# Patient Record
Sex: FEMALE | Race: BLACK OR AFRICAN AMERICAN | NOT HISPANIC OR LATINO | URBAN - METROPOLITAN AREA
[De-identification: names, ages, dates, MRNs, and addresses within clinical notes are randomized per-mention and may not be internally consistent; named-entity substitution may affect disease eponyms.]

---

## 2023-08-07 ENCOUNTER — OUT OF OFFICE VISIT (OUTPATIENT)
Dept: URBAN - METROPOLITAN AREA MEDICAL CENTER 12 | Facility: MEDICAL CENTER | Age: 83
End: 2023-08-07
Payer: MEDICARE

## 2023-08-07 DIAGNOSIS — D53.8 OTHER SPECIFIED NUTRITIONAL ANEMIAS: ICD-10-CM

## 2023-08-07 DIAGNOSIS — K92.1 ACUTE MELENA: ICD-10-CM

## 2023-08-07 DIAGNOSIS — R63.4 ABNORMAL INTENTIONAL WEIGHT LOSS: ICD-10-CM

## 2023-08-07 DIAGNOSIS — K26.9 CHILDHOOD DUODENAL ULCER: ICD-10-CM

## 2023-08-07 DIAGNOSIS — R93.2 ABN FIND-BILIARY TRACT: ICD-10-CM

## 2023-08-07 DIAGNOSIS — K25.9 ANTRAL ULCER: ICD-10-CM

## 2023-08-07 PROCEDURE — 99222 1ST HOSP IP/OBS MODERATE 55: CPT | Performed by: STUDENT IN AN ORGANIZED HEALTH CARE EDUCATION/TRAINING PROGRAM

## 2023-08-07 PROCEDURE — 99232 SBSQ HOSP IP/OBS MODERATE 35: CPT | Performed by: STUDENT IN AN ORGANIZED HEALTH CARE EDUCATION/TRAINING PROGRAM

## 2023-08-07 PROCEDURE — G8427 DOCREV CUR MEDS BY ELIG CLIN: HCPCS | Performed by: STUDENT IN AN ORGANIZED HEALTH CARE EDUCATION/TRAINING PROGRAM

## 2023-08-09 ENCOUNTER — CLAIMS CREATED FROM THE CLAIM WINDOW (OUTPATIENT)
Dept: URBAN - METROPOLITAN AREA MEDICAL CENTER 12 | Facility: MEDICAL CENTER | Age: 83
End: 2023-08-09
Payer: MEDICARE

## 2023-08-09 ENCOUNTER — CLAIMS CREATED FROM THE CLAIM WINDOW (OUTPATIENT)
Dept: URBAN - METROPOLITAN AREA MEDICAL CENTER 12 | Facility: MEDICAL CENTER | Age: 83
End: 2023-08-09

## 2023-08-09 DIAGNOSIS — B96.81 BACTERIAL INFECTION DUE TO H. PYLORI: ICD-10-CM

## 2023-08-09 DIAGNOSIS — K26.9 CHILDHOOD DUODENAL ULCER: ICD-10-CM

## 2023-08-09 DIAGNOSIS — K29.60 ADENOPAPILLOMATOSIS GASTRICA: ICD-10-CM

## 2023-08-09 DIAGNOSIS — K29.80 ACUTE DUODENITIS: ICD-10-CM

## 2023-08-09 PROCEDURE — 43239 EGD BIOPSY SINGLE/MULTIPLE: CPT | Performed by: INTERNAL MEDICINE

## 2023-09-13 ENCOUNTER — DASHBOARD ENCOUNTERS (OUTPATIENT)
Age: 83
End: 2023-09-13

## 2023-09-13 ENCOUNTER — OFFICE VISIT (OUTPATIENT)
Dept: URBAN - METROPOLITAN AREA CLINIC 92 | Facility: CLINIC | Age: 83
End: 2023-09-13
Payer: MEDICARE

## 2023-09-13 ENCOUNTER — WEB ENCOUNTER (OUTPATIENT)
Dept: URBAN - METROPOLITAN AREA CLINIC 92 | Facility: CLINIC | Age: 83
End: 2023-09-13

## 2023-09-13 VITALS
HEART RATE: 84 BPM | WEIGHT: 112 LBS | SYSTOLIC BLOOD PRESSURE: 188 MMHG | HEIGHT: 65 IN | TEMPERATURE: 96.6 F | DIASTOLIC BLOOD PRESSURE: 83 MMHG | BODY MASS INDEX: 18.66 KG/M2

## 2023-09-13 DIAGNOSIS — A04.8 BACTERIAL INFECTION DUE TO H. PYLORI: ICD-10-CM

## 2023-09-13 DIAGNOSIS — K62.6 RECTAL ULCER: ICD-10-CM

## 2023-09-13 PROBLEM — 428283002: Status: ACTIVE | Noted: 2023-09-13

## 2023-09-13 PROCEDURE — 99204 OFFICE O/P NEW MOD 45 MIN: CPT

## 2023-09-13 RX ORDER — GLIPIZIDE 5 MG/1
TAKE 1 TABLET (5 MG) BY ORAL ROUTE 2 TIMES PER DAY BEFORE MEALS TABLET ORAL 2
Qty: 0 | Refills: 0 | Status: ACTIVE | COMMUNITY
Start: 1900-01-01

## 2023-09-13 RX ORDER — ROSUVASTATIN CALCIUM 10 MG
TABLET ORAL
Qty: 0 | Refills: 0 | Status: ACTIVE | COMMUNITY
Start: 1900-01-01

## 2023-09-13 RX ORDER — LOSARTAN POTASSIUM 100 MG/1
TAKE 1 TABLET (100 MG) BY ORAL ROUTE ONCE DAILY TABLET, FILM COATED ORAL 1
Qty: 0 | Refills: 0 | Status: ACTIVE | COMMUNITY
Start: 1900-01-01

## 2023-09-13 RX ORDER — AMLODIPINE BESYLATE 10 MG/1
TAKE 1 TABLET (10 MG) BY ORAL ROUTE ONCE DAILY TABLET ORAL 1
Qty: 0 | Refills: 0 | Status: ACTIVE | COMMUNITY
Start: 1900-01-01

## 2023-09-13 NOTE — HPI-TODAY'S VISIT:
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      Per hospital note, patient was admitted at [facility] from 7/14 - 7/24/2023 for hematochezia at [facility]. She was found to have presented with hypotension and hypothermia in setting of BRBPR. She was admitted to ICU, resuscitated with 5 units of pRBCs and underwent colonoscopy showing polyps and rectal ulcers. Instructed to have a repeat colonoscopy in 2-3 months. Then hospitalized from 8/4 - 8/17/23 at [facility] for recurrent anemia, malaise, and melena. Hgb stable post 2 units PRBC.       EGD 8/9/23 with Dr. Salazar revealed normal esophagus, one gastric ulcer with no stigmata of bleeding, one duodenal ulcer with pigmented material one duodenal ulcer with no stigmata of bleeding, bx show ulcerated duodenal mucosa with acute and chronic inflammation, focal foveolar metaplasia, consistent with peptic duodenitis. Patient treated for H. pylori with PPI, flagyl, and clarithromycin. Patient is allergic to amoxicillin and tetracycline.      CT ABD/PEL 6/21/23 revealed pancreatic ucinate process cystic lesion measuring 2.2cm previously 1.1cm in 2021. Finding could represent a sidebranch IPMN.  Follow up MRI on 8/5/23 revealed multiple cystic lesions scattered throughout parenchyma, largest measuring 2.6cm likely IPMN w/o definite suspicious features, no pancreatic ductal dilatation.     Today, patient reports she had a hematology follow up in regard to an acute right upper extremity DVT when hospitalized, no blood thinner or further work up was recommended. Patient notes she feels well. Notes of occasional abdominal discomfort during a BM otherwise her stool color has normalized. Patient has 2-3 formed BM QD, typically after eating. Denies diarrhea or melena. Denies nausea or vomiting. C/W pantoprazole 40mg QD. Denies loss of appetite. Still recovering from weight loss during hospitalizations.  Denies family history of GI cancers. Denies NSAID use.

## 2023-09-14 LAB
A/G RATIO: 0.9
ABSOLUTE BASOPHILS: 67
ABSOLUTE EOSINOPHILS: 570
ABSOLUTE LYMPHOCYTES: 1427
ABSOLUTE MONOCYTES: 563
ABSOLUTE NEUTROPHILS: 4074
ALBUMIN: 3
ALKALINE PHOSPHATASE: 242
ALT (SGPT): 16
AST (SGOT): 25
BASOPHILS: 1
BILIRUBIN, TOTAL: 0.6
BUN/CREATININE RATIO: 4
BUN: 18
CALCIUM: 9.1
CARBON DIOXIDE, TOTAL: 27
CHLORIDE: 99
CREATININE: 4.28
EGFR: 10
EOSINOPHILS: 8.5
GLOBULIN, TOTAL: 3.4
GLUCOSE: 102
HEMATOCRIT: 33.6
HEMOGLOBIN: 10.9
LYMPHOCYTES: 21.3
MCH: 29.9
MCHC: 32.4
MCV: 92.1
MONOCYTES: 8.4
MPV: 10.4
NEUTROPHILS: 60.8
PLATELET COUNT: 196
POTASSIUM: 3.1
PROTEIN, TOTAL: 6.4
RDW: 13.4
RED BLOOD CELL COUNT: 3.65
SODIUM: 140
WHITE BLOOD CELL COUNT: 6.7

## 2023-09-15 ENCOUNTER — TELEPHONE ENCOUNTER (OUTPATIENT)
Dept: URBAN - METROPOLITAN AREA CLINIC 92 | Facility: CLINIC | Age: 83
End: 2023-09-15

## 2023-09-27 ENCOUNTER — LAB OUTSIDE AN ENCOUNTER (OUTPATIENT)
Dept: URBAN - METROPOLITAN AREA CLINIC 92 | Facility: CLINIC | Age: 83
End: 2023-09-27

## 2023-10-13 ENCOUNTER — OFFICE VISIT (OUTPATIENT)
Dept: URBAN - METROPOLITAN AREA CLINIC 92 | Facility: CLINIC | Age: 83
End: 2023-10-13

## 2024-03-13 ENCOUNTER — LAB (OUTPATIENT)
Dept: URBAN - METROPOLITAN AREA CLINIC 92 | Facility: CLINIC | Age: 84
End: 2024-03-13